# Patient Record
Sex: MALE | URBAN - METROPOLITAN AREA
[De-identification: names, ages, dates, MRNs, and addresses within clinical notes are randomized per-mention and may not be internally consistent; named-entity substitution may affect disease eponyms.]

---

## 2018-02-22 ENCOUNTER — TELEPHONE (OUTPATIENT)
Dept: SURGERY | Facility: CLINIC | Age: 63
End: 2018-02-22

## 2018-02-22 NOTE — TELEPHONE ENCOUNTER
Request for medical records faxed to [facility] at [phone]. Pt will obtain CD and bring to appointment.

## 2018-02-23 ENCOUNTER — TELEPHONE (OUTPATIENT)
Dept: SURGERY | Facility: CLINIC | Age: 63
End: 2018-02-23

## 2018-02-23 NOTE — TELEPHONE ENCOUNTER
Notified patient that Dr. Charmaine Yao had reviewed EUS and pathology; his recommendation is to monitor him for now. His case will be reviewed in next week's GI tumor board and patient will be contacted with further plan.  Dr. Charmaine Yao also would like patient to get

## 2018-02-28 ENCOUNTER — OFFICE VISIT (OUTPATIENT)
Dept: SURGERY | Facility: CLINIC | Age: 63
End: 2018-02-28

## 2018-02-28 VITALS
HEART RATE: 81 BPM | RESPIRATION RATE: 16 BRPM | SYSTOLIC BLOOD PRESSURE: 136 MMHG | TEMPERATURE: 97 F | DIASTOLIC BLOOD PRESSURE: 73 MMHG | WEIGHT: 180 LBS

## 2018-02-28 DIAGNOSIS — C18.1 PRIMARY APPENDICEAL ADENOCARCINOMA (HCC): Primary | ICD-10-CM

## 2018-02-28 DIAGNOSIS — K35.32 RUPTURED SUPPURATIVE APPENDICITIS: ICD-10-CM

## 2018-02-28 RX ORDER — FERROUS SULFATE 325(65) MG
TABLET ORAL
COMMUNITY

## 2018-02-28 RX ORDER — GLIPIZIDE 5 MG/1
TABLET, FILM COATED, EXTENDED RELEASE ORAL
COMMUNITY

## 2018-02-28 RX ORDER — SIMVASTATIN 40 MG
TABLET ORAL
COMMUNITY

## 2018-02-28 RX ORDER — LOSARTAN POTASSIUM AND HYDROCHLOROTHIAZIDE 25; 100 MG/1; MG/1
TABLET ORAL
COMMUNITY

## 2018-02-28 RX ORDER — ASPIRIN 81 MG/1
TABLET ORAL
COMMUNITY

## 2018-02-28 RX ORDER — METOPROLOL SUCCINATE 100 MG/1
TABLET, EXTENDED RELEASE ORAL
COMMUNITY

## 2018-02-28 NOTE — CONSULTS
[facility]    Patient Name:  Nancy Cota   YOB: 1955   Gender:  Male   Appt Date:  2/28/2018   Provider:  Elpidio Marquez MD          PATIENT PROVIDERS  Referring Provider: Self referred    Primary Care Provider: Dr. Caroline Romo Sitting, Cuff Size: large)   Pulse 81   Temp (!) 96.6 °F (35.9 °C) (Tympanic)   Resp 16   Wt 81.6 kg (180 lb)      Medications Reviewed:    Current Outpatient Prescriptions:   •  aspirin EC 81 MG Oral Tab EC, Take by mouth., Disp: , Rfl:   •  docusate sodi no palpitations   · GI: denies nausea, vomiting, constipation, diarrhea; no rectal bleeding  · GENITAL/: no blood in urine  · MUSCULOSKELETAL: no joint complaints, no back pain  · NEURO: no tingling, numbness, weakness  · ENDOCRINE: denies weight loss/ga laparoscopically at the time of right hemicolectomy to include laparoscopic omentectomy and cytoreductive surgery as necessary.   Patient had met with Dr. Lucinda Johnson at the [facility] who had recommended consideration for CRS/HIPEC to be staged in 2

## 2018-02-28 NOTE — PROGRESS NOTES
[facility]    Patient Name:  Kevyn Jones   YOB: 1955   Gender:  Male   Appt Date:  2/28/2018   Provider:  Arash Stanford MD   Insurance:       PATIENT PROVIDERS  Referring Provider: Self referred    Primary Care Provider:  Position: Sitting, Cuff Size: large)   Pulse 81   Temp (!) 96.6 °F (35.9 °C) (Tympanic)   Resp 16   Wt 81.6 kg (180 lb)      Medications Reviewed:    Current Outpatient Prescriptions:   •  aspirin EC 81 MG Oral Tab EC, Take by mouth., Disp: , Rfl:   •  doc per rectum. He reports no fatigue. He reports no blood in the urine, no changes in urinary habits: initiating urination requires straining, no changes in urinary habits: delays in starting hesitancy, and no change in urine appearance.  He reports no headach

## 2025-04-11 PROBLEM — C78.6 PERITONEAL CARCINOMATOSIS (HCC): Status: ACTIVE | Noted: 2020-11-09

## 2025-04-11 PROBLEM — C18.1 CANCER OF APPENDIX (HCC): Status: ACTIVE | Noted: 2018-03-25

## 2025-04-14 ENCOUNTER — OFFICE VISIT (OUTPATIENT)
Dept: SURGERY | Facility: CLINIC | Age: 70
End: 2025-04-14
Payer: MEDICARE

## 2025-04-14 VITALS
WEIGHT: 158 LBS | HEART RATE: 71 BPM | RESPIRATION RATE: 16 BRPM | SYSTOLIC BLOOD PRESSURE: 157 MMHG | TEMPERATURE: 98 F | OXYGEN SATURATION: 97 % | DIASTOLIC BLOOD PRESSURE: 80 MMHG

## 2025-04-14 DIAGNOSIS — C18.1 PRIMARY APPENDICEAL ADENOCARCINOMA (HCC): ICD-10-CM

## 2025-04-14 DIAGNOSIS — C78.6 PERITONEAL CARCINOMATOSIS (HCC): Primary | ICD-10-CM

## 2025-04-14 DIAGNOSIS — E11.9 TYPE 2 DIABETES MELLITUS WITHOUT COMPLICATION, UNSPECIFIED WHETHER LONG TERM INSULIN USE (HCC): ICD-10-CM

## 2025-04-14 RX ORDER — NYSTATIN 100000 [USP'U]/ML
5 SUSPENSION ORAL 4 TIMES DAILY
COMMUNITY

## 2025-04-14 RX ORDER — COLESTIPOL HYDROCHLORIDE 1 G/1
1 TABLET ORAL DAILY
COMMUNITY

## 2025-04-14 RX ORDER — ONDANSETRON 8 MG/1
8 TABLET, FILM COATED ORAL EVERY 6 HOURS PRN
COMMUNITY

## 2025-04-14 RX ORDER — FAMOTIDINE 20 MG/1
20 TABLET, FILM COATED ORAL 2 TIMES DAILY
COMMUNITY

## 2025-04-14 RX ORDER — PROCHLORPERAZINE MALEATE 10 MG
10 TABLET ORAL EVERY 6 HOURS PRN
COMMUNITY

## 2025-04-14 NOTE — CONSULTS
[facility]      Patient Name:  Balbir Porter   YOB: 1955   Gender:  Male   Appt Date:  4/14/2025   Provider:  Apolinar Pearl MD     PATIENT PROVIDERS  Referring Provider: Self    Primary Care Provider: Dr. 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     Balbir has a PMH of HTN, HLD, DM II, renal stones.  Patient has been followed by Dr. Rolle until 2023.   Dr. Monterroso is his medical oncologist at [facility].     2017: Patient presented w/ perforated appendix s/p IR drainage, appendectomy performed on 12/28/2017 which showed invasive moderately differentiated appendiceal adenocarcinoma with mucinous features in setting of perforation. Hospital course complicated by perihepatic abscesses s/p IR drain placement, which then was noted to have fistulous connection to small bowel.   5/11/2018: CRS w/ HIPEC with mitomycin-C.    June 2020 Found to have R ureteral obstructive mass, omental nodularity, and rising tumor markers.   Completed 4 cycles FOLFOXIRI/Avastin  11/12/20: CRS/HIPEC PCI score 20, complete cytoreduction score 1  Feb 2021-May 2021 completed 8 cycles of FOLFOX  June 2021 maintenance capecitabine, then 5-FU  March 2022: increased in tumor markers and increased pericolic nodules  April 2022: started on FOLFIRI  Stable disease the rising tumor markers.   11/21/2023: Per Dr. Rolle's reccomendations, rising tumor markers thought to be from abdominal wall rather that intraperitoneal disease. Offered cytoreductive surgery vs ablation cryoablation irreversible electroporation or even radiation. CEA 41.1  previously 19.8.    1/13/25: CEA 86.5  2/24/25: CEA 85.3  3/10/25: CEA 76  3/31/25: CEA 81.9    2/5/25: Routine Colonoscopy w/ Dr. Parker. Path showed tubular adenoma with high grade dysplasia with areas highly suspicious for invasive carcinoma.     CT abd/pelvis on 4/2/25: showed peritoneal carcinomatosis with metastatic diease in anterior abdominal wall with increase in size from prior examination. Severe dilation of R renal calyces and renal pelvis w/ hydroureter.     Patient is having Laser lithotripsy and stent placement to the right ureter on 4/15/25 by Dr. Devi.    Patient presents for reestablishment with surgical oncology. He denies any N/V. Has been having some RLQ abdominal pain over the past three weeks. Denies any flank pain, hematuria. Also c/o frequent BM, however this is unchanged since 2020.        Vital Signs:  /80 (BP Location: Right arm, Patient Position: Sitting, Cuff Size: adult)   Pulse 71   Temp 98.2 °F (36.8 °C) (Tympanic)   Resp 16   Wt 71.7 kg (158 lb)   SpO2 97%      Medications Reviewed:  Medications - Current[2]     Allergies Reviewed:  Allergies[3]     History:  Reviewed:  Past Medical History[4]   Reviewed:  Past Surgical History[5]   Reviewed Social History:  Short Social Hx on File[6]   Reviewed:  Family History[7]   Review of Systems:  GENERAL HEALTH: feels well, no fatigue.   SKIN: no change in mole.   HEENT: denies pain  RESPIRATORY: denies shortness of breath, wheezing or cough   CARDIOVASCULAR: denies chest pain, SOB, edema,orthopnea, no palpitations   GI: denies nausea, vomiting .+frequent BM's  GENITAL/: no blood in urine  MUSCULOSKELETAL: no joint complaints, no back pain  NEURO: no tingling, numbness, weakness  ENDOCRINE: denies weight loss/gain  PSYCH: no mood changes       Physical Examination:  Constitutional: General Appearance: healthy-appearing, well-nourished, and well-developed. Level of Distress: NAD.   Eyes: Sclera: non-icteric.   Neck: Neck:  supple.   Lymph Nodes: Lymph Nodes no cervical LAD, supraclavicular LAD, axillary LAD, or inguinal LAD.   Lungs: Auscultation: breath sounds normal.   Cardiovascular: Heart Auscultation: RRR.   Abdomen: Inspection and Palpation: no masses, tenderness (no guarding, no rebound), or CVA tenderness and soft and non-distended. Liver: no hepatomegaly. Spleen: no splenomegaly. Hernia: none palpable.   Musculoskeletal: Extremities: no edema.   Skin: Inspection and palpation: no jaundice.      Document Review:  CT abdomen/pelvis 4/2/25:  1) Findings of peritoneal carcinomatosis w/ metastatic disease to anterior abdominal wall. These lesions have mildly increased in size from prior exam on 2/3/25.  2) Severe dilation of R renal calyces and renal pelvis w/ proximal hydroureter. There is also associated mild wall thickening of right mid ureter with surrounding soft tissue prominence. Findings could represent implant or underlying stricture causing obstruction.   3) 4 mm nodule within the lingula previously measuring 2mm.    Pathology from Colonoscopy 2/5/25:   [facility] Department of Laboratory Medicine  Specimen #: [id number]   FINAL DIAGNOSIS:     Colon, sigmoid at 28 cm, biopsies:        -At least tubular adenoma with high-grade dysplasia with areas highly   suspicious for invasive carcinoma.     12/28/2017 Pathology from initial appendectomy.  Formerly named [facility]  [id number]  Pathology: Invasive moderately differentiated adenocarcinoma with focal mucin production and perforation arising in the mucinous cyst adenoma with high-grade dysplasia.  [id number]      Procedure(s):  None     Assessment / Plan:  Appendiceal carcinoma with peritoneal metastasis  Tubular adenoma/HG dysplasia-sigmoid, 28 cm  Findings were discussed with patient at length.  Patient understands that he has disease progression with significant abdominal wall involvement.  As such, I do not recommend/favor surgical management.  Will further  discuss at our multidisciplinary tumor board.  Patient agreed and understood.    Diabetes Mellitus Type II  -Controlled per patient                Electronically Signed by: Apolinar Pearl MD            [1]   Patient Active Problem List  Diagnosis    Primary appendiceal adenocarcinoma (HCC)    Hypertension    High cholesterol    Diabetes (HCC)    Cancer of appendix (HCC)    Peritoneal carcinomatosis (HCC)   [2]   Current Outpatient Medications:     colestipol 1 g Oral Tab, Take 1 tablet (1 g total) by mouth daily., Disp: , Rfl:     famotidine 20 MG Oral Tab, Take 1 tablet (20 mg total) by mouth 2 (two) times daily., Disp: , Rfl:     ondansetron (ZOFRAN) 8 MG tablet, Take 1 tablet (8 mg total) by mouth every 6 (six) hours as needed for Nausea., Disp: , Rfl:     prochlorperazine (COMPAZINE) 10 mg tablet, Take 1 tablet (10 mg total) by mouth every 6 (six) hours as needed., Disp: , Rfl:     nystatin 959059 UNIT/ML Mouth/Throat Suspension, 5 mL (500,000 Units total) 4 (four) times daily., Disp: , Rfl:     gabapentin 100 MG Oral Cap, Take 1 capsule (100 mg total) by mouth 2 (two) times daily., Disp: , Rfl:     GlipiZIDE ER 5 MG Oral Tablet 24 Hr, Take by mouth., Disp: , Rfl:     Metoprolol Succinate  MG Oral Tablet 24 Hr, Take by mouth., Disp: , Rfl:     simvastatin 40 MG Oral Tab, Take by mouth., Disp: , Rfl:   [3] No Known Allergies  [4]   Past Medical History:   Calculus of kidney    Diabetes (HCC)    High cholesterol    Hypertension   [5]   Past Surgical History:  Procedure Laterality Date    Colonoscopy  02/2025    Laparoscopic appendectomy  12/28/2017    Other  01/01/2018    exploratory laparotomy    Other surgical history  05/18/2018    CRS/HIPEC    Other surgical history  11/2020    CRS/HIPEC    Other surgical history  03/2025    Kidney stone removed    Other surgical history      Multiple urinary stents placed   [6]   Social History  Socioeconomic History    Marital status:    Tobacco Use    Smoking  status: Never    Smokeless tobacco: Never   Substance and Sexual Activity    Alcohol use: Yes     Comment: rare social    Drug use: No   [7]   Family History  Problem Relation Age of Onset    Other (Other) Daughter 3        Rhabdomyosarcoma

## 2025-04-23 ENCOUNTER — TELEPHONE (OUTPATIENT)
Dept: SURGERY | Facility: CLINIC | Age: 70
End: 2025-04-23

## 2025-04-23 NOTE — TELEPHONE ENCOUNTER
Called patient and discussed tumor board recommendations from 4/23/25. At this time pt not a surgical candidate given extent of abdominal wall involvement. Pt may consider consultation with GI for consideration of EGD to evaluate possible endoluminal mass in stomach. Can also consider referral to KRAS mutation inhibitor trial.     VERONICA Johnson